# Patient Record
Sex: MALE | Race: WHITE | URBAN - METROPOLITAN AREA
[De-identification: names, ages, dates, MRNs, and addresses within clinical notes are randomized per-mention and may not be internally consistent; named-entity substitution may affect disease eponyms.]

---

## 2017-01-27 RX ORDER — AMLODIPINE BESYLATE 5 MG/1
TABLET ORAL
Qty: 30 TABLET | Refills: 0 | Status: SHIPPED | OUTPATIENT
Start: 2017-01-27 | End: 2017-03-04

## 2017-03-04 ENCOUNTER — LAB ENCOUNTER (OUTPATIENT)
Dept: LAB | Age: 42
End: 2017-03-04
Attending: FAMILY MEDICINE
Payer: COMMERCIAL

## 2017-03-04 ENCOUNTER — OFFICE VISIT (OUTPATIENT)
Dept: FAMILY MEDICINE CLINIC | Facility: CLINIC | Age: 42
End: 2017-03-04

## 2017-03-04 VITALS
TEMPERATURE: 98 F | SYSTOLIC BLOOD PRESSURE: 124 MMHG | HEIGHT: 72 IN | DIASTOLIC BLOOD PRESSURE: 84 MMHG | BODY MASS INDEX: 30.48 KG/M2 | HEART RATE: 60 BPM | WEIGHT: 225 LBS

## 2017-03-04 DIAGNOSIS — Z00.00 HEALTH CARE MAINTENANCE: Primary | ICD-10-CM

## 2017-03-04 DIAGNOSIS — Z00.00 HEALTH CARE MAINTENANCE: ICD-10-CM

## 2017-03-04 PROBLEM — I10 BENIGN ESSENTIAL HYPERTENSION: Status: ACTIVE | Noted: 2017-03-04

## 2017-03-04 LAB
ALBUMIN SERPL-MCNC: 4 G/DL (ref 3.5–4.8)
ALP LIVER SERPL-CCNC: 90 U/L (ref 45–117)
ALT SERPL-CCNC: 49 U/L (ref 17–63)
AST SERPL-CCNC: 21 U/L (ref 15–41)
BASOPHILS # BLD AUTO: 0.02 X10(3) UL (ref 0–0.1)
BASOPHILS NFR BLD AUTO: 0.4 %
BILIRUB SERPL-MCNC: 0.8 MG/DL (ref 0.1–2)
BUN BLD-MCNC: 22 MG/DL (ref 8–20)
CALCIUM BLD-MCNC: 8.7 MG/DL (ref 8.3–10.3)
CHLORIDE: 105 MMOL/L (ref 101–111)
CHOLEST SMN-MCNC: 167 MG/DL (ref ?–200)
CO2: 28 MMOL/L (ref 22–32)
CREAT BLD-MCNC: 1.03 MG/DL (ref 0.7–1.3)
EOSINOPHIL # BLD AUTO: 0.3 X10(3) UL (ref 0–0.3)
EOSINOPHIL NFR BLD AUTO: 6.2 %
ERYTHROCYTE [DISTWIDTH] IN BLOOD BY AUTOMATED COUNT: 12.4 % (ref 11.5–16)
GLUCOSE BLD-MCNC: 88 MG/DL (ref 70–99)
HCT VFR BLD AUTO: 43.8 % (ref 37–53)
HDLC SERPL-MCNC: 42 MG/DL (ref 45–?)
HDLC SERPL: 3.98 {RATIO} (ref ?–4.97)
HGB BLD-MCNC: 15 G/DL (ref 13–17)
IMMATURE GRANULOCYTE COUNT: 0.01 X10(3) UL (ref 0–1)
IMMATURE GRANULOCYTE RATIO %: 0.2 %
LDLC SERPL CALC-MCNC: 110 MG/DL (ref ?–130)
LYMPHOCYTES # BLD AUTO: 1.23 X10(3) UL (ref 0.9–4)
LYMPHOCYTES NFR BLD AUTO: 25.6 %
M PROTEIN MFR SERPL ELPH: 7.3 G/DL (ref 6.1–8.3)
MCH RBC QN AUTO: 29.2 PG (ref 27–33.2)
MCHC RBC AUTO-ENTMCNC: 34.2 G/DL (ref 31–37)
MCV RBC AUTO: 85.4 FL (ref 80–99)
MONOCYTES # BLD AUTO: 0.39 X10(3) UL (ref 0.1–0.6)
MONOCYTES NFR BLD AUTO: 8.1 %
NEUTROPHIL ABS PRELIM: 2.86 X10 (3) UL (ref 1.3–6.7)
NEUTROPHILS # BLD AUTO: 2.86 X10(3) UL (ref 1.3–6.7)
NEUTROPHILS NFR BLD AUTO: 59.5 %
NONHDLC SERPL-MCNC: 125 MG/DL (ref ?–130)
PLATELET # BLD AUTO: 214 10(3)UL (ref 150–450)
POTASSIUM SERPL-SCNC: 3.9 MMOL/L (ref 3.6–5.1)
RBC # BLD AUTO: 5.13 X10(6)UL (ref 4.3–5.7)
RED CELL DISTRIBUTION WIDTH-SD: 38 FL (ref 35.1–46.3)
SODIUM SERPL-SCNC: 140 MMOL/L (ref 136–144)
TRIGLYCERIDES: 73 MG/DL (ref ?–150)
TSI SER-ACNC: 1.36 MIU/ML (ref 0.35–5.5)
VLDL: 15 MG/DL (ref 5–40)
WBC # BLD AUTO: 4.8 X10(3) UL (ref 4–13)

## 2017-03-04 PROCEDURE — 84443 ASSAY THYROID STIM HORMONE: CPT

## 2017-03-04 PROCEDURE — 99396 PREV VISIT EST AGE 40-64: CPT | Performed by: FAMILY MEDICINE

## 2017-03-04 PROCEDURE — 80061 LIPID PANEL: CPT

## 2017-03-04 PROCEDURE — 85025 COMPLETE CBC W/AUTO DIFF WBC: CPT

## 2017-03-04 PROCEDURE — 80053 COMPREHEN METABOLIC PANEL: CPT

## 2017-03-04 RX ORDER — AMLODIPINE BESYLATE 5 MG/1
5 TABLET ORAL
Qty: 90 TABLET | Refills: 3 | Status: SHIPPED | OUTPATIENT
Start: 2017-03-04 | End: 2018-03-07

## 2017-03-04 NOTE — PROGRESS NOTES
[facility] [place]  PROGRESS NOTE  Chief Complaint:   Patient presents with:  Physical      HPI:   This is a 39year old male coming in for health checkup. Patient takes blood pressure medication for hypertension.   He has not had any home blood enlarged nodes or history of splenectomy. PSYCHIATRIC:  Denies depression or anxiety.       EXAM:   /84 mmHg  Pulse 60  Temp(Src) 97.6 °F (36.4 °C) (Tympanic)  Ht 72\"  Wt 225 lb  BMI 30.51 kg/m2 Estimated body mass index is 30.51 kg/(m^2) as calcula verbalizes understanding. Patient is notified to call with any questions, complications, allergies, or worsening or changing symptoms. Patient is to call with any side effects or complications from the treatments as a result of today.      Problem List:  P

## 2017-03-06 ENCOUNTER — TELEPHONE (OUTPATIENT)
Dept: FAMILY MEDICINE CLINIC | Facility: CLINIC | Age: 42
End: 2017-03-06

## 2017-03-06 NOTE — TELEPHONE ENCOUNTER
----- Message from Karlee Braun MD sent at 3/6/2017  7:34 AM CST -----  Please call pt with normal results.

## 2018-03-07 DIAGNOSIS — Z00.00 HEALTH CARE MAINTENANCE: Primary | ICD-10-CM

## 2018-03-07 RX ORDER — AMLODIPINE BESYLATE 5 MG/1
TABLET ORAL
Qty: 90 TABLET | Refills: 0 | Status: SHIPPED | OUTPATIENT
Start: 2018-03-07 | End: 2018-04-21

## 2018-03-07 NOTE — TELEPHONE ENCOUNTER
Future appt:    Last Appointment:  Visit date not found    Cholesterol, Total (mg/dL)   Date Value   03/04/2017 167   ----------  HDL Cholesterol (mg/dL)   Date Value   03/04/2017 42 (L)   ----------  LDL Cholesterol (mg/dL)   Date Value   03/04/2017 110

## 2018-04-21 ENCOUNTER — LABORATORY ENCOUNTER (OUTPATIENT)
Dept: LAB | Age: 43
End: 2018-04-21
Attending: FAMILY MEDICINE
Payer: COMMERCIAL

## 2018-04-21 ENCOUNTER — TELEPHONE (OUTPATIENT)
Dept: FAMILY MEDICINE CLINIC | Facility: CLINIC | Age: 43
End: 2018-04-21

## 2018-04-21 DIAGNOSIS — Z00.00 HEALTH CARE MAINTENANCE: ICD-10-CM

## 2018-04-21 PROCEDURE — 80061 LIPID PANEL: CPT | Performed by: FAMILY MEDICINE

## 2018-04-21 PROCEDURE — 80050 GENERAL HEALTH PANEL: CPT | Performed by: FAMILY MEDICINE

## 2018-04-21 PROCEDURE — 36415 COLL VENOUS BLD VENIPUNCTURE: CPT | Performed by: FAMILY MEDICINE

## 2018-04-21 RX ORDER — AMLODIPINE BESYLATE 5 MG/1
5 TABLET ORAL
Qty: 90 TABLET | Refills: 0 | Status: SHIPPED | OUTPATIENT
Start: 2018-04-21 | End: 2018-05-05

## 2018-04-21 NOTE — TELEPHONE ENCOUNTER
Wants to know if bp medication can be called into [facility]. Dai scheduled to see Dr Aniceto Moon on 5/5/18 for annual px.

## 2018-04-23 ENCOUNTER — TELEPHONE (OUTPATIENT)
Dept: FAMILY MEDICINE CLINIC | Facility: CLINIC | Age: 43
End: 2018-04-23

## 2018-04-23 NOTE — TELEPHONE ENCOUNTER
Informed pt of his blood work results. Pt expressed understanding and thanks. Will discuss further at appt.     Future Appointments  Date Time Provider Arnoldo Jennifer   5/5/2018 9:00 AM Abbie Hendrix MD [facility] [place]

## 2018-04-23 NOTE — TELEPHONE ENCOUNTER
----- Message from Cornelio Severe, MD sent at 4/23/2018  7:16 AM CDT -----  Labs are normal with the exception of the ALT, liver enzyme, being slightly elevated at 79, should be less than 63.   Will discuss further with patient at his time of office visit

## 2018-05-05 ENCOUNTER — OFFICE VISIT (OUTPATIENT)
Dept: FAMILY MEDICINE CLINIC | Facility: CLINIC | Age: 43
End: 2018-05-05

## 2018-05-05 VITALS
TEMPERATURE: 98 F | WEIGHT: 223.63 LBS | BODY MASS INDEX: 30.29 KG/M2 | HEIGHT: 72 IN | HEART RATE: 58 BPM | SYSTOLIC BLOOD PRESSURE: 128 MMHG | DIASTOLIC BLOOD PRESSURE: 90 MMHG | RESPIRATION RATE: 16 BRPM

## 2018-05-05 DIAGNOSIS — Z00.00 HEALTH CARE MAINTENANCE: Primary | ICD-10-CM

## 2018-05-05 PROCEDURE — 99396 PREV VISIT EST AGE 40-64: CPT | Performed by: FAMILY MEDICINE

## 2018-05-05 RX ORDER — AMLODIPINE BESYLATE 5 MG/1
5 TABLET ORAL
Qty: 90 TABLET | Refills: 3 | Status: SHIPPED | OUTPATIENT
Start: 2018-05-05 | End: 2019-06-07

## 2018-05-05 NOTE — PATIENT INSTRUCTIONS
Continue his same medications. Continue to work on diet and weight. Recommend home blood pressure monitor.

## 2018-05-05 NOTE — PROGRESS NOTES
[facility] [place]  PROGRESS NOTE  Chief Complaint:   Patient presents with:  Physical      HPI:   This is a 37year old male coming in for general wellness and recheck of hypertension. Overall the patient feels well.   He takes Norvasc 5 mg for 0. 01 0.00 - 1.00 x10(3) uL   Neutrophil % 63.9 %   Lymphocyte % 22.8 %   Monocyte % 8.0 %   Eosinophil % 4.9 %   Basophil % 0.2 %   Immature Granulocyte % 0.2 %       Past Medical History:   Diagnosis Date   • Hyperlipidemia    • Hypertension      Past Kwan Patient Position: Sitting, Cuff Size: adult)   Pulse 58   Temp (!) 97.5 °F (36.4 °C) (Temporal)   Resp 16   Ht 72\"   Wt 223 lb 9.6 oz   BMI 30.33 kg/m²  Estimated body mass index is 30.33 kg/m² as calculated from the following:    Height as of this encoun would increase his medication. Health Maintenance:  Annual Depression Screen due on 03/04/2018  Annual Physical due on 03/04/2018    Patient/Caregiver Education: Patient/Caregiver Education: There are no barriers to learning. Medical education done.

## 2019-06-06 NOTE — TELEPHONE ENCOUNTER
Future appt:  No future appointments.   Last Appointment: 5/5/18 with Dr. Veda Angelucci  Cholesterol, Total (mg/dL)   Date Value   04/21/2018 158     HDL Cholesterol (mg/dL)   Date Value   04/21/2018 37 (L)     LDL Cholesterol (mg/dL)   Date Value   04/21/2018 1

## 2019-06-06 NOTE — TELEPHONE ENCOUNTER
Spoke with patient and he states he was not sure which doctor he needed to follow up with because he saw Dr. Simona Gray last year. Pt will call today to schedule an appointment.  Pt states he only has 5 tablets of the Amlodipine left and will need a refill to

## 2019-06-07 RX ORDER — AMLODIPINE BESYLATE 5 MG/1
5 TABLET ORAL
Qty: 90 TABLET | Refills: 0 | Status: SHIPPED | OUTPATIENT
Start: 2019-06-07 | End: 2019-08-31

## 2019-06-07 NOTE — TELEPHONE ENCOUNTER
Patient called back and scheduled for Dr. Bobby Chau next available Saturday morning. Please advise refill.      Future Appointments   Date Time Provider Arnoldo Rodriguez   8/3/2019  9:15 AM Omkar Marx MD [facility] [place] [facility]

## 2019-06-07 NOTE — TELEPHONE ENCOUNTER
Left detailed message informing patient that Dr. Rosa M Painting would like him to schedule his follow up appointment so we can get him that refill. Stated to call the office back to schedule.

## 2019-08-31 ENCOUNTER — OFFICE VISIT (OUTPATIENT)
Dept: FAMILY MEDICINE CLINIC | Facility: CLINIC | Age: 44
End: 2019-08-31
Payer: COMMERCIAL

## 2019-08-31 VITALS
HEART RATE: 52 BPM | SYSTOLIC BLOOD PRESSURE: 126 MMHG | RESPIRATION RATE: 14 BRPM | DIASTOLIC BLOOD PRESSURE: 78 MMHG | BODY MASS INDEX: 28.5 KG/M2 | HEIGHT: 72 IN | WEIGHT: 210.38 LBS | OXYGEN SATURATION: 98 % | TEMPERATURE: 97 F

## 2019-08-31 DIAGNOSIS — Z00.00 HEALTH CARE MAINTENANCE: Primary | ICD-10-CM

## 2019-08-31 DIAGNOSIS — I10 BENIGN HYPERTENSION: ICD-10-CM

## 2019-08-31 PROCEDURE — 99396 PREV VISIT EST AGE 40-64: CPT | Performed by: FAMILY MEDICINE

## 2019-08-31 RX ORDER — AMLODIPINE BESYLATE 5 MG/1
5 TABLET ORAL
Qty: 90 TABLET | Refills: 3 | Status: SHIPPED | OUTPATIENT
Start: 2019-08-31 | End: 2020-09-22

## 2019-08-31 NOTE — PROGRESS NOTES
Chief Complaint:   Patient presents with:  Medication Follow-Up      HPI:   This is a 40year old male coming in for healthcare check with medication follow-up. Patient has been feeling well. Patient with a history for hypertension.   He takes his medic 16.0 %    RDW-SD 38.0 35.1 - 46.3 fL    Neutrophil Absolute Prelim 3.37 1.30 - 6.70 x10 (3) uL    Neutrophil Absolute 3.37 1.30 - 6.70 x10(3) uL    Lymphocyte Absolute 1.20 0.90 - 4.00 x10(3) uL    Monocyte Absolute 0.42 0.10 - 1.00 x10(3) uL    Eosinophil production. GASTROINTESTINAL:  Denies abdominal pain, nausea, vomiting, constipation, diarrhea    MUSCULOSKELETAL:  Denies weakness, muscle aches,   NEUROLOGICAL:  Denies headache, dizziness,       PSYCHIATRIC:  Denies depression or anxiety.   ENDOCRINOL Prescriptions     Signed Prescriptions Disp Refills   • amLODIPine Besylate 5 MG Oral Tab 90 tablet 3     Sig: Take 1 tablet (5 mg total) by mouth once daily.        Patient Instructions   Good exam       Refocus on healthy nutrition and regular activity

## 2019-08-31 NOTE — PATIENT INSTRUCTIONS
Good exam       Refocus on healthy nutrition and regular activity       Refills sent       Recheck in 1 year.

## 2020-03-16 ENCOUNTER — TELEPHONE (OUTPATIENT)
Dept: FAMILY MEDICINE CLINIC | Facility: CLINIC | Age: 45
End: 2020-03-16

## 2020-03-16 NOTE — TELEPHONE ENCOUNTER
OTW for Tuesday- pt has fever and diarrhea- work wants pt to get tested for Covid-19 in order to return to work- wants to talk to nurse -

## 2020-03-16 NOTE — TELEPHONE ENCOUNTER
Patient states last night he started getting very hot and having diarrhea. Patient states the hot feeling lasted all night long as well as the diarrhea. Patient did not take his temperature last night but knows he had a fever based on how hot he was.      P

## 2020-03-17 NOTE — TELEPHONE ENCOUNTER
Patient states that his work will accept a letter from Dr. Mell Francois. Patient requests letter be faxed to [phone].

## 2020-09-22 RX ORDER — AMLODIPINE BESYLATE 5 MG/1
5 TABLET ORAL
Qty: 90 TABLET | Refills: 0 | Status: SHIPPED | OUTPATIENT
Start: 2020-09-22 | End: 2020-12-30

## 2020-09-22 NOTE — TELEPHONE ENCOUNTER
Future Appointments   Date Time Provider Arnoldo Rodriguez   10/17/2020  9:45 AM Harika Oliver MD [facility] [place] [facility] [place]       Future appt:    Last Appointment with provider:   08/31/19 Medication follow up  Last appointment at [facility]:  Visit

## 2020-09-26 RX ORDER — AMLODIPINE BESYLATE 5 MG/1
5 TABLET ORAL
Qty: 90 TABLET | Refills: 0 | OUTPATIENT
Start: 2020-09-26

## 2020-12-30 RX ORDER — AMLODIPINE BESYLATE 5 MG/1
TABLET ORAL
Qty: 90 TABLET | Refills: 0 | Status: SHIPPED | OUTPATIENT
Start: 2020-12-30 | End: 2021-07-19

## 2020-12-30 NOTE — TELEPHONE ENCOUNTER
Reviewed chart and it looks like pt has had to reschedule appt's for physical. Per chart pt needs a Saturday. Ag Kimbrough spoke with pt and schedule a Saturday appt for him .     Future Appointments   Date Time Provider Arnoldo Rodriguez   1/30/2021  9:45 A

## 2020-12-30 NOTE — TELEPHONE ENCOUNTER
Please advise refill of Amlodipine 5mg.   Last Rx: 9/22/20    Future appt:    Last Appointment with provider:   8/31/19 - Physical - per notes recheck in 1 year    Last appointment at [facility]:  8/31/19    Cholesterol, Total (mg/dL)   Date Value   04/21

## 2021-01-30 ENCOUNTER — LAB ENCOUNTER (OUTPATIENT)
Dept: LAB | Age: 46
End: 2021-01-30
Attending: FAMILY MEDICINE
Payer: COMMERCIAL

## 2021-01-30 ENCOUNTER — OFFICE VISIT (OUTPATIENT)
Dept: FAMILY MEDICINE CLINIC | Facility: CLINIC | Age: 46
End: 2021-01-30
Payer: COMMERCIAL

## 2021-01-30 VITALS
BODY MASS INDEX: 29.17 KG/M2 | OXYGEN SATURATION: 96 % | HEART RATE: 84 BPM | SYSTOLIC BLOOD PRESSURE: 124 MMHG | WEIGHT: 215.38 LBS | DIASTOLIC BLOOD PRESSURE: 82 MMHG | HEIGHT: 72 IN | TEMPERATURE: 98 F | RESPIRATION RATE: 16 BRPM

## 2021-01-30 DIAGNOSIS — Z00.00 HEALTH CARE MAINTENANCE: ICD-10-CM

## 2021-01-30 DIAGNOSIS — I10 BENIGN ESSENTIAL HYPERTENSION: ICD-10-CM

## 2021-01-30 DIAGNOSIS — Z00.00 HEALTH CARE MAINTENANCE: Primary | ICD-10-CM

## 2021-01-30 LAB
ALBUMIN SERPL-MCNC: 3.9 G/DL (ref 3.4–5)
ALBUMIN/GLOB SERPL: 1.2 {RATIO} (ref 1–2)
ALP LIVER SERPL-CCNC: 71 U/L
ALT SERPL-CCNC: 46 U/L
ANION GAP SERPL CALC-SCNC: 3 MMOL/L (ref 0–18)
AST SERPL-CCNC: 28 U/L (ref 15–37)
BASOPHILS # BLD AUTO: 0.03 X10(3) UL (ref 0–0.2)
BASOPHILS NFR BLD AUTO: 0.7 %
BILIRUB SERPL-MCNC: 0.6 MG/DL (ref 0.1–2)
BILIRUB UR QL STRIP.AUTO: NEGATIVE
BUN BLD-MCNC: 19 MG/DL (ref 7–18)
BUN/CREAT SERPL: 19.4 (ref 10–20)
CALCIUM BLD-MCNC: 8.7 MG/DL (ref 8.5–10.1)
CHLORIDE SERPL-SCNC: 108 MMOL/L (ref 98–112)
CHOLEST SMN-MCNC: 185 MG/DL (ref ?–200)
CO2 SERPL-SCNC: 28 MMOL/L (ref 21–32)
COLOR UR AUTO: YELLOW
CREAT BLD-MCNC: 0.98 MG/DL
DEPRECATED RDW RBC AUTO: 37.8 FL (ref 35.1–46.3)
EOSINOPHIL # BLD AUTO: 0.21 X10(3) UL (ref 0–0.7)
EOSINOPHIL NFR BLD AUTO: 5.1 %
ERYTHROCYTE [DISTWIDTH] IN BLOOD BY AUTOMATED COUNT: 12.6 % (ref 11–15)
GLOBULIN PLAS-MCNC: 3.3 G/DL (ref 2.8–4.4)
GLUCOSE BLD-MCNC: 94 MG/DL (ref 70–99)
GLUCOSE UR STRIP.AUTO-MCNC: NEGATIVE MG/DL
HCT VFR BLD AUTO: 45.2 %
HDLC SERPL-MCNC: 48 MG/DL (ref 40–59)
HGB BLD-MCNC: 15.7 G/DL
IMM GRANULOCYTES # BLD AUTO: 0 X10(3) UL (ref 0–1)
IMM GRANULOCYTES NFR BLD: 0 %
KETONES UR STRIP.AUTO-MCNC: NEGATIVE MG/DL
LDLC SERPL CALC-MCNC: 119 MG/DL (ref ?–100)
LEUKOCYTE ESTERASE UR QL STRIP.AUTO: NEGATIVE
LYMPHOCYTES # BLD AUTO: 1.21 X10(3) UL (ref 1–4)
LYMPHOCYTES NFR BLD AUTO: 29.4 %
M PROTEIN MFR SERPL ELPH: 7.2 G/DL (ref 6.4–8.2)
MCH RBC QN AUTO: 29 PG (ref 26–34)
MCHC RBC AUTO-ENTMCNC: 34.7 G/DL (ref 31–37)
MCV RBC AUTO: 83.4 FL
MONOCYTES # BLD AUTO: 0.35 X10(3) UL (ref 0.1–1)
MONOCYTES NFR BLD AUTO: 8.5 %
NEUTROPHILS # BLD AUTO: 2.31 X10 (3) UL (ref 1.5–7.7)
NEUTROPHILS # BLD AUTO: 2.31 X10(3) UL (ref 1.5–7.7)
NEUTROPHILS NFR BLD AUTO: 56.3 %
NITRITE UR QL STRIP.AUTO: NEGATIVE
NONHDLC SERPL-MCNC: 137 MG/DL (ref ?–130)
OSMOLALITY SERPL CALC.SUM OF ELEC: 290 MOSM/KG (ref 275–295)
PATIENT FASTING Y/N/NP: YES
PATIENT FASTING Y/N/NP: YES
PH UR STRIP.AUTO: 5 [PH] (ref 4.5–8)
PLATELET # BLD AUTO: 214 10(3)UL (ref 150–450)
POTASSIUM SERPL-SCNC: 3.8 MMOL/L (ref 3.5–5.1)
PROT UR STRIP.AUTO-MCNC: NEGATIVE MG/DL
RBC # BLD AUTO: 5.42 X10(6)UL
RBC UR QL AUTO: NEGATIVE
SODIUM SERPL-SCNC: 139 MMOL/L (ref 136–145)
SP GR UR STRIP.AUTO: 1.02 (ref 1–1.03)
TRIGL SERPL-MCNC: 89 MG/DL (ref 30–149)
TSI SER-ACNC: 1.26 MIU/ML (ref 0.36–3.74)
UROBILINOGEN UR STRIP.AUTO-MCNC: <2 MG/DL
VLDLC SERPL CALC-MCNC: 18 MG/DL (ref 0–30)
WBC # BLD AUTO: 4.1 X10(3) UL (ref 4–11)

## 2021-01-30 PROCEDURE — 80053 COMPREHEN METABOLIC PANEL: CPT

## 2021-01-30 PROCEDURE — 84443 ASSAY THYROID STIM HORMONE: CPT

## 2021-01-30 PROCEDURE — 99396 PREV VISIT EST AGE 40-64: CPT | Performed by: FAMILY MEDICINE

## 2021-01-30 PROCEDURE — 80061 LIPID PANEL: CPT

## 2021-01-30 PROCEDURE — 36415 COLL VENOUS BLD VENIPUNCTURE: CPT

## 2021-01-30 PROCEDURE — 3079F DIAST BP 80-89 MM HG: CPT | Performed by: FAMILY MEDICINE

## 2021-01-30 PROCEDURE — 81003 URINALYSIS AUTO W/O SCOPE: CPT

## 2021-01-30 PROCEDURE — 3074F SYST BP LT 130 MM HG: CPT | Performed by: FAMILY MEDICINE

## 2021-01-30 PROCEDURE — 3008F BODY MASS INDEX DOCD: CPT | Performed by: FAMILY MEDICINE

## 2021-01-30 PROCEDURE — 85025 COMPLETE CBC W/AUTO DIFF WBC: CPT

## 2021-01-30 NOTE — PROGRESS NOTES
Chief Complaint:   Patient presents with: Well Adult      HPI:   This is a 39year old male coming in for healthcare check with medication follow-up. Patient has been feeling well. Patient is due for laboratory testing.       Patient with a history for Neutrophil Absolute 3.37 1.30 - 6.70 x10(3) uL    Lymphocyte Absolute 1.20 0.90 - 4.00 x10(3) uL    Monocyte Absolute 0.42 0.10 - 1.00 x10(3) uL    Eosinophil Absolute 0.26 0.00 - 0.30 x10(3) uL    Basophil Absolute 0.01 0.00 - 0.10 x10(3) uL    Immature G changes required):  with 3 children; Work:  - [employer]      company           Current Meds:  Current Outpatient Medications   Medication Sig Dispense Refill   • AMLODIPINE BESYLATE 5 MG Oral Tab Take 1 tablet by mouth once daily. no thyromegaly.   SKIN: No rashes, no skin lesion, no bruising, good turgor.     HEART:  Regular rate and rhythm, no murmurs,      LUNGS: Clear to auscultation bilterally, no rales/rhonchi/wheezing.     ABDOMEN:  Soft, nondistended, nontender, bowel sounds

## 2021-02-09 ENCOUNTER — TELEPHONE (OUTPATIENT)
Dept: FAMILY MEDICINE CLINIC | Facility: CLINIC | Age: 46
End: 2021-02-09

## 2021-02-09 NOTE — TELEPHONE ENCOUNTER
----- Message from Juan Riley MD sent at 2/9/2021  8:45 AM CST -----  Labs reviewed. (Recent health check)    Urinalysis unremarkable. Chemistry profile.   Blood sugar 94 normal.  Kidney function and liver function tests normal.    Cholesterol pro

## 2021-07-19 RX ORDER — AMLODIPINE BESYLATE 5 MG/1
TABLET ORAL
Qty: 90 TABLET | Refills: 1 | Status: SHIPPED | OUTPATIENT
Start: 2021-07-19 | End: 2022-01-31

## 2021-10-25 ENCOUNTER — OFFICE VISIT (OUTPATIENT)
Dept: FAMILY MEDICINE CLINIC | Facility: CLINIC | Age: 46
End: 2021-10-25
Payer: COMMERCIAL

## 2021-10-25 VITALS
RESPIRATION RATE: 16 BRPM | HEIGHT: 72 IN | OXYGEN SATURATION: 97 % | DIASTOLIC BLOOD PRESSURE: 70 MMHG | BODY MASS INDEX: 29.93 KG/M2 | HEART RATE: 59 BPM | WEIGHT: 221 LBS | SYSTOLIC BLOOD PRESSURE: 130 MMHG

## 2021-10-25 DIAGNOSIS — H61.23 BILATERAL IMPACTED CERUMEN: Primary | ICD-10-CM

## 2021-10-25 PROCEDURE — 3078F DIAST BP <80 MM HG: CPT | Performed by: NURSE PRACTITIONER

## 2021-10-25 PROCEDURE — 3008F BODY MASS INDEX DOCD: CPT | Performed by: NURSE PRACTITIONER

## 2021-10-25 PROCEDURE — 3075F SYST BP GE 130 - 139MM HG: CPT | Performed by: NURSE PRACTITIONER

## 2021-10-25 PROCEDURE — 99213 OFFICE O/P EST LOW 20 MIN: CPT | Performed by: NURSE PRACTITIONER

## 2021-10-25 NOTE — PROGRESS NOTES
CHIEF COMPLAINT:   Patient presents with:  Ear Problem  Ear Wax      HPI:   Igor Rodriguez is a 55year old male who presents to clinic today with complaints of ears plugged - worse at night- states he hasn't used drops.    No illness - no fever   Had prevent buildup of wax in the future      Patient voiced understanding and is in agreement with treatment plan. Follow up if symptoms do not improve or if symptoms worsen.       Meds & Refills for this Visit:  Requested Prescriptions      No prescriptions

## 2021-10-25 NOTE — PATIENT INSTRUCTIONS
Soak years with hydrogen peroxide once a week and rinse in the shower with warm water–rinse ears in the shower each time you shower to help prevent buildup of wax in the future

## 2022-01-31 ENCOUNTER — TELEPHONE (OUTPATIENT)
Dept: FAMILY MEDICINE CLINIC | Facility: CLINIC | Age: 47
End: 2022-01-31

## 2022-01-31 RX ORDER — AMLODIPINE BESYLATE 5 MG/1
5 TABLET ORAL DAILY
Qty: 30 TABLET | Refills: 1 | Status: SHIPPED | OUTPATIENT
Start: 2022-01-31

## 2022-01-31 NOTE — TELEPHONE ENCOUNTER
Rx Request from [facility] for Amlodipine. Left message for pt to schedule well adult visit. Future appt:    Last Appointment with provider: 1/30/21 with TR for well adult with plan for recheck in 1 year. Last appointment at [facility]:  10/25/2021  Cholesterol, Total (mg/dL)   Date Value   01/30/2021 185     HDL Cholesterol (mg/dL)   Date Value   01/30/2021 48     LDL Cholesterol (mg/dL)   Date Value   01/30/2021 119 (H)     Triglycerides (mg/dL)   Date Value   01/30/2021 89     No results found for: EAG, A1C  Lab Results   Component Value Date    TSH 1.260 01/30/2021       No follow-ups on file.

## 2022-02-03 NOTE — TELEPHONE ENCOUNTER
Future Appointments   Date Time Provider Arnoldo Rodriguez   4/9/2022  9:45 AM Dannie Romeo MD [facility] [place] [facility]

## 2022-04-09 ENCOUNTER — LABORATORY ENCOUNTER (OUTPATIENT)
Dept: LAB | Age: 47
End: 2022-04-09
Attending: FAMILY MEDICINE
Payer: COMMERCIAL

## 2022-04-09 ENCOUNTER — OFFICE VISIT (OUTPATIENT)
Dept: FAMILY MEDICINE CLINIC | Facility: CLINIC | Age: 47
End: 2022-04-09
Payer: COMMERCIAL

## 2022-04-09 VITALS
SYSTOLIC BLOOD PRESSURE: 124 MMHG | HEIGHT: 72.25 IN | BODY MASS INDEX: 28.78 KG/M2 | HEART RATE: 56 BPM | DIASTOLIC BLOOD PRESSURE: 78 MMHG | WEIGHT: 214.81 LBS | OXYGEN SATURATION: 98 % | TEMPERATURE: 98 F

## 2022-04-09 DIAGNOSIS — Z00.00 HEALTH CARE MAINTENANCE: ICD-10-CM

## 2022-04-09 DIAGNOSIS — Z00.00 HEALTH CARE MAINTENANCE: Primary | ICD-10-CM

## 2022-04-09 DIAGNOSIS — I10 BENIGN ESSENTIAL HYPERTENSION: ICD-10-CM

## 2022-04-09 LAB
ALBUMIN SERPL-MCNC: 4.2 G/DL (ref 3.4–5)
ALBUMIN/GLOB SERPL: 1.4 {RATIO} (ref 1–2)
ALP LIVER SERPL-CCNC: 83 U/L
ALT SERPL-CCNC: 68 U/L
ANION GAP SERPL CALC-SCNC: 5 MMOL/L (ref 0–18)
AST SERPL-CCNC: 29 U/L (ref 15–37)
BASOPHILS # BLD AUTO: 0.02 X10(3) UL (ref 0–0.2)
BASOPHILS NFR BLD AUTO: 0.4 %
BILIRUB SERPL-MCNC: 0.7 MG/DL (ref 0.1–2)
BILIRUB UR QL STRIP.AUTO: NEGATIVE
BUN BLD-MCNC: 20 MG/DL (ref 7–18)
CALCIUM BLD-MCNC: 9.3 MG/DL (ref 8.5–10.1)
CHLORIDE SERPL-SCNC: 107 MMOL/L (ref 98–112)
CHOLEST SERPL-MCNC: 209 MG/DL (ref ?–200)
CLARITY UR REFRACT.AUTO: CLEAR
CO2 SERPL-SCNC: 30 MMOL/L (ref 21–32)
COLOR UR AUTO: YELLOW
CREAT BLD-MCNC: 1.12 MG/DL
EOSINOPHIL # BLD AUTO: 0.27 X10(3) UL (ref 0–0.7)
EOSINOPHIL NFR BLD AUTO: 5.3 %
ERYTHROCYTE [DISTWIDTH] IN BLOOD BY AUTOMATED COUNT: 12.3 %
FASTING PATIENT LIPID ANSWER: YES
FASTING STATUS PATIENT QL REPORTED: YES
GLOBULIN PLAS-MCNC: 3.1 G/DL (ref 2.8–4.4)
GLUCOSE BLD-MCNC: 107 MG/DL (ref 70–99)
GLUCOSE UR STRIP.AUTO-MCNC: NEGATIVE MG/DL
HCT VFR BLD AUTO: 47.1 %
HDLC SERPL-MCNC: 41 MG/DL (ref 40–59)
HGB BLD-MCNC: 16.3 G/DL
IMM GRANULOCYTES # BLD AUTO: 0.02 X10(3) UL (ref 0–1)
IMM GRANULOCYTES NFR BLD: 0.4 %
KETONES UR STRIP.AUTO-MCNC: NEGATIVE MG/DL
LDLC SERPL CALC-MCNC: 146 MG/DL (ref ?–100)
LEUKOCYTE ESTERASE UR QL STRIP.AUTO: NEGATIVE
LYMPHOCYTES # BLD AUTO: 1.42 X10(3) UL (ref 1–4)
LYMPHOCYTES NFR BLD AUTO: 27.7 %
MCH RBC QN AUTO: 29.4 PG (ref 26–34)
MCHC RBC AUTO-ENTMCNC: 34.6 G/DL (ref 31–37)
MCV RBC AUTO: 85 FL
MONOCYTES # BLD AUTO: 0.45 X10(3) UL (ref 0.1–1)
MONOCYTES NFR BLD AUTO: 8.8 %
NEUTROPHILS # BLD AUTO: 2.95 X10 (3) UL (ref 1.5–7.7)
NEUTROPHILS # BLD AUTO: 2.95 X10(3) UL (ref 1.5–7.7)
NEUTROPHILS NFR BLD AUTO: 57.4 %
NITRITE UR QL STRIP.AUTO: NEGATIVE
NONHDLC SERPL-MCNC: 168 MG/DL (ref ?–130)
OSMOLALITY SERPL CALC.SUM OF ELEC: 297 MOSM/KG (ref 275–295)
PH UR STRIP.AUTO: 5 [PH] (ref 5–8)
PLATELET # BLD AUTO: 209 10(3)UL (ref 150–450)
POTASSIUM SERPL-SCNC: 4.3 MMOL/L (ref 3.5–5.1)
PROT SERPL-MCNC: 7.3 G/DL (ref 6.4–8.2)
PROT UR STRIP.AUTO-MCNC: NEGATIVE MG/DL
RBC # BLD AUTO: 5.54 X10(6)UL
RBC UR QL AUTO: NEGATIVE
SODIUM SERPL-SCNC: 142 MMOL/L (ref 136–145)
SP GR UR STRIP.AUTO: 1.02 (ref 1–1.03)
TRIGL SERPL-MCNC: 122 MG/DL (ref 30–149)
TSI SER-ACNC: 1.16 MIU/ML (ref 0.36–3.74)
UROBILINOGEN UR STRIP.AUTO-MCNC: <2 MG/DL
VLDLC SERPL CALC-MCNC: 23 MG/DL (ref 0–30)
WBC # BLD AUTO: 5.1 X10(3) UL (ref 4–11)

## 2022-04-09 PROCEDURE — 81003 URINALYSIS AUTO W/O SCOPE: CPT

## 2022-04-09 PROCEDURE — 85025 COMPLETE CBC W/AUTO DIFF WBC: CPT

## 2022-04-09 PROCEDURE — 80053 COMPREHEN METABOLIC PANEL: CPT

## 2022-04-09 PROCEDURE — 3078F DIAST BP <80 MM HG: CPT | Performed by: FAMILY MEDICINE

## 2022-04-09 PROCEDURE — 36415 COLL VENOUS BLD VENIPUNCTURE: CPT

## 2022-04-09 PROCEDURE — 99396 PREV VISIT EST AGE 40-64: CPT | Performed by: FAMILY MEDICINE

## 2022-04-09 PROCEDURE — 84443 ASSAY THYROID STIM HORMONE: CPT

## 2022-04-09 PROCEDURE — 3074F SYST BP LT 130 MM HG: CPT | Performed by: FAMILY MEDICINE

## 2022-04-09 PROCEDURE — 3008F BODY MASS INDEX DOCD: CPT | Performed by: FAMILY MEDICINE

## 2022-04-09 PROCEDURE — 80061 LIPID PANEL: CPT

## 2022-04-09 NOTE — PATIENT INSTRUCTIONS
Good exam       Stay active. Discussed colon cancer screening - colonoscopy vs. cologuard  Recommend check with insurance for coverage. Labs today     [id number] [address] for refills. Recheck in 1 year.

## 2022-04-20 ENCOUNTER — TELEPHONE (OUTPATIENT)
Dept: FAMILY MEDICINE CLINIC | Facility: CLINIC | Age: 47
End: 2022-04-20

## 2022-04-20 NOTE — TELEPHONE ENCOUNTER
----- Message from Georgina Magallanes MD sent at 4/20/2022 12:59 PM CDT -----  Labs reveiwed (recent health check)     Chem profile - mild elevation of blood sugar - decrease sweets and starches. Kidney function and liver testing normal     Cholesterol profile - total 209 /   - elevated compared with last 2 checks - recommend low fat/ low cholesterol diet and recheck in 1 year. Thyroid testing normal     CBC normal     Urinalysis - normal     Recommend stay active and recheck labs in 1 year. Patient was to call his insurance regarding coverage of cologuard vs. Colonoscopy  - has this been done ?

## 2022-04-21 NOTE — TELEPHONE ENCOUNTER
Patient informed of the below results and recommendations. Patient states he has not called his insurance yet. States he will either call this week or next and then let the office know.

## 2022-06-06 RX ORDER — AMLODIPINE BESYLATE 5 MG/1
5 TABLET ORAL DAILY
Qty: 30 TABLET | Refills: 2 | Status: SHIPPED | OUTPATIENT
Start: 2022-06-06

## 2022-06-06 NOTE — TELEPHONE ENCOUNTER
Future appt:    Last Appointment with provider:   4/9/2022 for annual physical.  Last appointment at [facility]:  4/9/2022  Cholesterol, Total (mg/dL)   Date Value   04/09/2022 209 (H)     HDL Cholesterol (mg/dL)   Date Value   04/09/2022 41     LDL Cholesterol (mg/dL)   Date Value   04/09/2022 146 (H)     Triglycerides (mg/dL)   Date Value   04/09/2022 122     No results found for: EAG, A1C  Lab Results   Component Value Date    TSH 1.160 04/09/2022       No follow-ups on file.

## 2022-11-03 RX ORDER — AMLODIPINE BESYLATE 5 MG/1
5 TABLET ORAL DAILY
Qty: 30 TABLET | Refills: 3 | Status: SHIPPED | OUTPATIENT
Start: 2022-11-03

## 2022-11-03 NOTE — TELEPHONE ENCOUNTER
Last refill - 6/6/22 #30 w/2 refills    Future appt:    Last Appointment with provider: 4/9/22 - px     Last appointment at [facility]:  Visit date not found    Cholesterol, Total (mg/dL)   Date Value   04/09/2022 209 (H)     HDL Cholesterol (mg/dL)   Date Value   04/09/2022 41     LDL Cholesterol (mg/dL)   Date Value   04/09/2022 146 (H)     Triglycerides (mg/dL)   Date Value   04/09/2022 122     No results found for: EAG, A1C  Lab Results   Component Value Date    TSH 1.160 04/09/2022       No follow-ups on file.

## 2023-04-03 DIAGNOSIS — I10 BENIGN ESSENTIAL HYPERTENSION: Primary | ICD-10-CM

## 2023-04-03 NOTE — TELEPHONE ENCOUNTER
Last Refill: 11/3/2022 #30 with 3 refills  Last Px: 4/9/2022  F/U Instructions: Recheck in 1 year    Future appt: Your appointments     Date & Time Appointment Department [facility])    Jun 09, 2023  4:00 PM CDT Physical - Established with Keegan Nugent  [facility] [place] ([facility])            [address] [facility] [address] [id number]  [phone]        Last Appointment with provider:   Visit date not found  Last appointment at [facility]:  Visit date not found  Cholesterol, Total (mg/dL)   Date Value   04/09/2022 209 (H)     HDL Cholesterol (mg/dL)   Date Value   04/09/2022 41     LDL Cholesterol (mg/dL)   Date Value   04/09/2022 146 (H)     Triglycerides (mg/dL)   Date Value   04/09/2022 122     No results found for: EAG, A1C  Lab Results   Component Value Date    TSH 1.160 04/09/2022       No follow-ups on file.

## 2023-04-04 RX ORDER — AMLODIPINE BESYLATE 5 MG/1
5 TABLET ORAL DAILY
Qty: 30 TABLET | Refills: 3 | Status: SHIPPED | OUTPATIENT
Start: 2023-04-04

## 2024-07-06 NOTE — LETTER
Date: 3/17/2020    Patient Name: Daija Hensley  : 1975      To Whom it may concern: This letter has been written at the patient's request. The above patient was seen at the [facility] for treatment of a medical condition.     Alma Delia Cedillo yes

## (undated) NOTE — MR AVS SNAPSHOT
[address]  [place] [id number]  [phone]               Thank you for choosing us for your health care visit with Jamie Colon MD.  We are glad to serve you and happy to provide you with this summary of Commonly known as:  NORVASC           betamethasone valerate 0.1 % Crea   Commonly known as:  VALISONE                Where to Get Your Medications      These medications were sent to [address], [place] - [address] [place] [address] [phone], [phone] Water is best for hydration Fast food. Eat at home when possible     Tips for increasing your physical activity – Adults who are physically active are less likely to develop some chronic diseases than adults who are inactive.      HOW TO GET STARTED: HOW

## (undated) NOTE — LETTER
04/06/18        [address] [place] [id number]      Dear Curly Demarco,    [address] [place] [address] records indicate that you have outstanding lab work and or testing that was ordered for you and has not yet been completed:          Lipid Panel [E]      Com